# Patient Record
(demographics unavailable — no encounter records)

---

## 2025-02-08 NOTE — HISTORY OF PRESENT ILLNESS
[FreeTextEntry1] : CPE [de-identified] : complex medical history most recently admitted to [facility] for management TIA discharged approx 2 weeks ago. Extensive w/u for no clear etiology for TIA . currently on eliquis followed by Dr Rider who has seen patient recently multiple additional problems includdHX ASD,hx TIA x 2. hx AF on eliquis Currently awake alert verbal no acute complaints

## 2025-02-08 NOTE — PHYSICAL EXAM
[No Acute Distress] : no acute distress [No JVD] : no jugular venous distention [Clear to Auscultation] : lungs were clear to auscultation bilaterally [Regular Rhythm] : with a regular rhythm [No Edema] : there was no peripheral edema [Soft] : abdomen soft [Non Tender] : non-tender [No Rash] : no rash [Alert and Oriented x3] : oriented to person, place, and time [de-identified] : marked inflammatory changes both hands involving DIP/PIP joints

## 2025-02-08 NOTE — PHYSICAL EXAM
[No Acute Distress] : no acute distress [No JVD] : no jugular venous distention [Clear to Auscultation] : lungs were clear to auscultation bilaterally [Regular Rhythm] : with a regular rhythm [No Edema] : there was no peripheral edema [Soft] : abdomen soft [Non Tender] : non-tender [No Rash] : no rash [Alert and Oriented x3] : oriented to person, place, and time [de-identified] : marked inflammatory changes both hands involving DIP/PIP joints

## 2025-02-08 NOTE — PHYSICAL EXAM
[No Acute Distress] : no acute distress [No JVD] : no jugular venous distention [Clear to Auscultation] : lungs were clear to auscultation bilaterally [Regular Rhythm] : with a regular rhythm [No Edema] : there was no peripheral edema [Soft] : abdomen soft [Non Tender] : non-tender [No Rash] : no rash [Alert and Oriented x3] : oriented to person, place, and time [de-identified] : marked inflammatory changes both hands involving DIP/PIP joints Authored by Resident/PA/NP

## 2025-02-08 NOTE — PHYSICAL EXAM
[No Acute Distress] : no acute distress [No JVD] : no jugular venous distention [Clear to Auscultation] : lungs were clear to auscultation bilaterally [Regular Rhythm] : with a regular rhythm [No Edema] : there was no peripheral edema [Soft] : abdomen soft [Non Tender] : non-tender [No Rash] : no rash [Alert and Oriented x3] : oriented to person, place, and time [de-identified] : marked inflammatory changes both hands involving DIP/PIP joints

## 2025-02-08 NOTE — PHYSICAL EXAM
[No Acute Distress] : no acute distress [No JVD] : no jugular venous distention [Clear to Auscultation] : lungs were clear to auscultation bilaterally [Regular Rhythm] : with a regular rhythm [No Edema] : there was no peripheral edema [Soft] : abdomen soft [Non Tender] : non-tender [No Rash] : no rash [Alert and Oriented x3] : oriented to person, place, and time [de-identified] : marked inflammatory changes both hands involving DIP/PIP joints

## 2025-02-08 NOTE — HISTORY OF PRESENT ILLNESS
[FreeTextEntry1] : CPE [de-identified] : complex medical history most recently admitted to [facility] for management TIA discharged approx 2 weeks ago. Extensive w/u for no clear etiology for TIA . currently on eliquis followed by Dr Rider who has seen patient recently multiple additional problems includdHX ASD,hx TIA x 2. hx AF on eliquis Currently awake alert verbal no acute complaints

## 2025-02-08 NOTE — ASSESSMENT
[FreeTextEntry1] : currently alert ambulatory no specific complaints will be seen by cardiology next week as current BP is elevated

## 2025-02-08 NOTE — HISTORY OF PRESENT ILLNESS
[FreeTextEntry1] : CPE [de-identified] : complex medical history most recently admitted to [facility] for management TIA discharged approx 2 weeks ago. Extensive w/u for no clear etiology for TIA . currently on eliquis followed by Dr Rider who has seen patient recently multiple additional problems includdHX ASD,hx TIA x 2. hx AF on eliquis Currently awake alert verbal no acute complaints

## 2025-02-08 NOTE — HEALTH RISK ASSESSMENT
[Fair] :  ~his/her~ mood as fair [No] : In the past 12 months have you used drugs other than those required for medical reasons? No [No falls in past year] : Patient reported no falls in the past year [0] : 1) Little interest or pleasure doing things: Not at all (0) [1] : 2) Feeling down, depressed, or hopeless for several days (1) [Never] : Never [With Family] : lives with family [Retired] : retired [] :  [Fully functional (bathing, dressing, toileting, transferring, walking, feeding)] : Fully functional (bathing, dressing, toileting, transferring, walking, feeding) [Fully functional (using the telephone, shopping, preparing meals, housekeeping, doing laundry, using] : Fully functional and needs no help or supervision to perform IADLs (using the telephone, shopping, preparing meals, housekeeping, doing laundry, using transportation, managing medications and managing finances) [Smoke Detector] : smoke detector [Carbon Monoxide Detector] : carbon monoxide detector [Safety elements used in home] : safety elements used in home [Seat Belt] :  uses seat belt [Sunscreen] : uses sunscreen [de-identified] : very little walking [Audit-CScore] : 0 [de-identified] : regular [[id number]] : 1 [Reports changes in hearing] : Reports no changes in hearing [Reports changes in vision] : Reports no changes in vision [Reports changes in dental health] : Reports no changes in dental health [ColonoscopyComments] : scheduled

## 2025-02-08 NOTE — HEALTH RISK ASSESSMENT
[Fair] :  ~his/her~ mood as fair [No] : In the past 12 months have you used drugs other than those required for medical reasons? No [No falls in past year] : Patient reported no falls in the past year [0] : 1) Little interest or pleasure doing things: Not at all (0) [1] : 2) Feeling down, depressed, or hopeless for several days (1) [Never] : Never [With Family] : lives with family [Retired] : retired [] :  [Fully functional (bathing, dressing, toileting, transferring, walking, feeding)] : Fully functional (bathing, dressing, toileting, transferring, walking, feeding) [Fully functional (using the telephone, shopping, preparing meals, housekeeping, doing laundry, using] : Fully functional and needs no help or supervision to perform IADLs (using the telephone, shopping, preparing meals, housekeeping, doing laundry, using transportation, managing medications and managing finances) [Smoke Detector] : smoke detector [Carbon Monoxide Detector] : carbon monoxide detector [Safety elements used in home] : safety elements used in home [Seat Belt] :  uses seat belt [Sunscreen] : uses sunscreen [de-identified] : very little walking [Audit-CScore] : 0 [de-identified] : regular [[id number]] : 1 [Reports changes in hearing] : Reports no changes in hearing [Reports changes in vision] : Reports no changes in vision [Reports changes in dental health] : Reports no changes in dental health [ColonoscopyComments] : scheduled

## 2025-02-08 NOTE — HEALTH RISK ASSESSMENT
[Fair] :  ~his/her~ mood as fair [No] : In the past 12 months have you used drugs other than those required for medical reasons? No [No falls in past year] : Patient reported no falls in the past year [0] : 1) Little interest or pleasure doing things: Not at all (0) [1] : 2) Feeling down, depressed, or hopeless for several days (1) [Never] : Never [With Family] : lives with family [Retired] : retired [] :  [Fully functional (bathing, dressing, toileting, transferring, walking, feeding)] : Fully functional (bathing, dressing, toileting, transferring, walking, feeding) [Fully functional (using the telephone, shopping, preparing meals, housekeeping, doing laundry, using] : Fully functional and needs no help or supervision to perform IADLs (using the telephone, shopping, preparing meals, housekeeping, doing laundry, using transportation, managing medications and managing finances) [Smoke Detector] : smoke detector [Carbon Monoxide Detector] : carbon monoxide detector [Safety elements used in home] : safety elements used in home [Seat Belt] :  uses seat belt [Sunscreen] : uses sunscreen [Audit-CScore] : 0 [de-identified] : very little walking [de-identified] : regular [[id number]] : 1 [Reports changes in hearing] : Reports no changes in hearing [Reports changes in vision] : Reports no changes in vision [Reports changes in dental health] : Reports no changes in dental health [ColonoscopyComments] : scheduled

## 2025-02-08 NOTE — HISTORY OF PRESENT ILLNESS
[FreeTextEntry1] : CPE [de-identified] : complex medical history most recently admitted to [facility] for management TIA discharged approx 2 weeks ago. Extensive w/u for no clear etiology for TIA . currently on eliquis followed by Dr Rider who has seen patient recently multiple additional problems includdHX ASD,hx TIA x 2. hx AF on eliquis Currently awake alert verbal no acute complaints

## 2025-02-08 NOTE — HEALTH RISK ASSESSMENT
[Fair] :  ~his/her~ mood as fair [No] : In the past 12 months have you used drugs other than those required for medical reasons? No [No falls in past year] : Patient reported no falls in the past year [0] : 1) Little interest or pleasure doing things: Not at all (0) [1] : 2) Feeling down, depressed, or hopeless for several days (1) [Never] : Never [With Family] : lives with family [Retired] : retired [] :  [Fully functional (bathing, dressing, toileting, transferring, walking, feeding)] : Fully functional (bathing, dressing, toileting, transferring, walking, feeding) [Fully functional (using the telephone, shopping, preparing meals, housekeeping, doing laundry, using] : Fully functional and needs no help or supervision to perform IADLs (using the telephone, shopping, preparing meals, housekeeping, doing laundry, using transportation, managing medications and managing finances) [Smoke Detector] : smoke detector [Carbon Monoxide Detector] : carbon monoxide detector [Safety elements used in home] : safety elements used in home [Seat Belt] :  uses seat belt [Sunscreen] : uses sunscreen [Audit-CScore] : 0 [de-identified] : very little walking [de-identified] : regular [[id number]] : 1 [Reports changes in hearing] : Reports no changes in hearing [Reports changes in vision] : Reports no changes in vision [Reports changes in dental health] : Reports no changes in dental health [ColonoscopyComments] : scheduled

## 2025-02-08 NOTE — HISTORY OF PRESENT ILLNESS
[FreeTextEntry1] : CPE [de-identified] : complex medical history most recently admitted to [facility] for management TIA discharged approx 2 weeks ago. Extensive w/u for no clear etiology for TIA . currently on eliquis followed by Dr Rider who has seen patient recently multiple additional problems includdHX ASD,hx TIA x 2. hx AF on eliquis Currently awake alert verbal no acute complaints Universal Safety Interventions

## 2025-02-08 NOTE — REVIEW OF SYSTEMS
[Joint Pain] : joint pain [Joint Stiffness] : joint stiffness [Fever] : no fever [Earache] : no earache [Memory Loss] : no memory loss

## 2025-02-08 NOTE — HEALTH RISK ASSESSMENT
[Fair] :  ~his/her~ mood as fair [No] : In the past 12 months have you used drugs other than those required for medical reasons? No [No falls in past year] : Patient reported no falls in the past year [0] : 1) Little interest or pleasure doing things: Not at all (0) [1] : 2) Feeling down, depressed, or hopeless for several days (1) [Never] : Never [With Family] : lives with family [Retired] : retired [] :  [Fully functional (bathing, dressing, toileting, transferring, walking, feeding)] : Fully functional (bathing, dressing, toileting, transferring, walking, feeding) [Fully functional (using the telephone, shopping, preparing meals, housekeeping, doing laundry, using] : Fully functional and needs no help or supervision to perform IADLs (using the telephone, shopping, preparing meals, housekeeping, doing laundry, using transportation, managing medications and managing finances) [Smoke Detector] : smoke detector [Carbon Monoxide Detector] : carbon monoxide detector [Safety elements used in home] : safety elements used in home [Seat Belt] :  uses seat belt [Sunscreen] : uses sunscreen [Audit-CScore] : 0 [de-identified] : very little walking [de-identified] : regular [[id number]] : 1 [Reports changes in hearing] : Reports no changes in hearing [Reports changes in vision] : Reports no changes in vision [Reports changes in dental health] : Reports no changes in dental health [ColonoscopyComments] : scheduled

## 2025-03-06 NOTE — HISTORY OF PRESENT ILLNESS
[FreeTextEntry1] : had two TIA's first in june 2024 second january 2025  [de-identified] : Had colonoscopy last month Dr Verdugo 2 polyps were removed past hx hyperparathyroidism saw neurologist one month ago  seeks Dr Hammer for AF

## 2025-03-06 NOTE — HEALTH RISK ASSESSMENT
[Yes] : Yes [Monthly or less (1 pt)] : Monthly or less (1 point) [1 or 2 (0 pts)] : 1 or 2 (0 points) [Never (0 pts)] : Never (0 points) [No] : In the past 12 months have you used drugs other than those required for medical reasons? No [No falls in past year] : Patient reported no falls in the past year [0] : 2) Feeling down, depressed, or hopeless: Not at all (0) [Never] : Never [Audit-CScore] : 1 [de-identified] : walking [de-identified] : regular [[id number]] : 0

## 2025-03-06 NOTE — HEALTH RISK ASSESSMENT
[Yes] : Yes [Monthly or less (1 pt)] : Monthly or less (1 point) [1 or 2 (0 pts)] : 1 or 2 (0 points) [Never (0 pts)] : Never (0 points) [No] : In the past 12 months have you used drugs other than those required for medical reasons? No [No falls in past year] : Patient reported no falls in the past year [0] : 2) Feeling down, depressed, or hopeless: Not at all (0) [Never] : Never [Audit-CScore] : 1 [de-identified] : walking [de-identified] : regular [[id number]] : 0

## 2025-03-06 NOTE — REVIEW OF SYSTEMS
[Fever] : no fever [Earache] : no earache [Chest Pain] : no chest pain [Shortness Of Breath] : no shortness of breath [Dysuria] : no dysuria [Joint Pain] : joint pain [Joint Stiffness] : joint stiffness [Memory Loss] : no memory loss

## 2025-03-06 NOTE — HISTORY OF PRESENT ILLNESS
[FreeTextEntry1] : had two TIA's first in june 2024 second january 2025  [de-identified] : Had colonoscopy last month Dr Verdugo 2 polyps were removed past hx hyperparathyroidism saw neurologist one month ago  seeks Dr Hammer for AF

## 2025-03-06 NOTE — ASSESSMENT
[FreeTextEntry1] : multiple stable medical complaints appears to be hypervigilant in terms of health needs endocrine f/u for management hyperparathyroidism very concerned about prostate  cancer currently prostate exam is normal, to repeat PSA to get ongoing f/u with neurology and cardiac [Kaylee]  will be seeing shortly re AF

## 2025-03-06 NOTE — PHYSICAL EXAM
[No Acute Distress] : no acute distress [No JVD] : no jugular venous distention [Clear to Auscultation] : lungs were clear to auscultation bilaterally [Normal Rate] : normal rate  [No Murmur] : no murmur heard [No Edema] : there was no peripheral edema [Soft] : abdomen soft [Non Tender] : non-tender [Normal Supraclavicular Nodes] : no supraclavicular lymphadenopathy [No Rash] : no rash [No Focal Deficits] : no focal deficits [Alert and Oriented x3] : oriented to person, place, and time [FreeTextEntry1] : normal prostate

## 2025-04-04 NOTE — HISTORY OF PRESENT ILLNESS
[de-identified] : 73yo male who is being referred by Dr. Solano for hyperparthryoidism. hx. kidney stones 2/2025 calcium 10.6 VitD 25OH 26.7   1/2025 calcium 10.5 Vit D25 OH 31     6/2023 calcium 10.5 PTH 67.7 10/19/2024 NM Parathyroid reporting sestamibi avid nodule posterior to the right thyroid lobe most likely representing a parathyroid adenoma 5/2024 Bone Density normal on eliquis 5mg BID

## 2025-04-04 NOTE — HISTORY OF PRESENT ILLNESS
[de-identified] : 75yo male who is being referred by Dr. Solano for hyperparthryoidism. hx. kidney stones 2/2025 calcium 10.6 VitD 25OH 26.7   1/2025 calcium 10.5 Vit D25 OH 31     6/2023 calcium 10.5 PTH 67.7 10/19/2024 NM Parathyroid reporting sestamibi avid nodule posterior to the right thyroid lobe most likely representing a parathyroid adenoma 5/2024 Bone Density normal on eliquis 5mg BID